# Patient Record
Sex: MALE | Race: WHITE | NOT HISPANIC OR LATINO | Employment: OTHER | URBAN - METROPOLITAN AREA
[De-identification: names, ages, dates, MRNs, and addresses within clinical notes are randomized per-mention and may not be internally consistent; named-entity substitution may affect disease eponyms.]

---

## 2023-07-19 ENCOUNTER — LAB (OUTPATIENT)
Dept: LAB | Facility: LAB | Age: 73
End: 2023-07-19
Payer: MEDICARE

## 2023-07-19 ENCOUNTER — OFFICE VISIT (OUTPATIENT)
Dept: PRIMARY CARE | Facility: CLINIC | Age: 73
End: 2023-07-19
Payer: MEDICARE

## 2023-07-19 VITALS
BODY MASS INDEX: 25.33 KG/M2 | HEART RATE: 53 BPM | WEIGHT: 171 LBS | TEMPERATURE: 98 F | HEIGHT: 69 IN | DIASTOLIC BLOOD PRESSURE: 80 MMHG | SYSTOLIC BLOOD PRESSURE: 113 MMHG | OXYGEN SATURATION: 98 %

## 2023-07-19 DIAGNOSIS — I10 HYPERTENSION, UNSPECIFIED TYPE: ICD-10-CM

## 2023-07-19 DIAGNOSIS — I10 ESSENTIAL (PRIMARY) HYPERTENSION: ICD-10-CM

## 2023-07-19 DIAGNOSIS — Z12.5 ENCOUNTER FOR SCREENING FOR MALIGNANT NEOPLASM OF PROSTATE: ICD-10-CM

## 2023-07-19 DIAGNOSIS — Z00.00 VISIT FOR PREVENTIVE HEALTH EXAMINATION: ICD-10-CM

## 2023-07-19 DIAGNOSIS — Z00.00 VISIT FOR PREVENTIVE HEALTH EXAMINATION: Primary | ICD-10-CM

## 2023-07-19 LAB
ALANINE AMINOTRANSFERASE (SGPT) (U/L) IN SER/PLAS: 13 U/L (ref 10–52)
ALBUMIN (G/DL) IN SER/PLAS: 4.6 G/DL (ref 3.4–5)
ALKALINE PHOSPHATASE (U/L) IN SER/PLAS: 68 U/L (ref 33–136)
ANION GAP IN SER/PLAS: 12 MMOL/L (ref 10–20)
ASPARTATE AMINOTRANSFERASE (SGOT) (U/L) IN SER/PLAS: 15 U/L (ref 9–39)
BILIRUBIN TOTAL (MG/DL) IN SER/PLAS: 1.9 MG/DL (ref 0–1.2)
CALCIUM (MG/DL) IN SER/PLAS: 9.7 MG/DL (ref 8.6–10.6)
CARBON DIOXIDE, TOTAL (MMOL/L) IN SER/PLAS: 30 MMOL/L (ref 21–32)
CHLORIDE (MMOL/L) IN SER/PLAS: 104 MMOL/L (ref 98–107)
CHOLESTEROL (MG/DL) IN SER/PLAS: 141 MG/DL (ref 0–199)
CHOLESTEROL IN HDL (MG/DL) IN SER/PLAS: 43.5 MG/DL
CHOLESTEROL/HDL RATIO: 3.2
CREATININE (MG/DL) IN SER/PLAS: 1.17 MG/DL (ref 0.5–1.3)
ERYTHROCYTE DISTRIBUTION WIDTH (RATIO) BY AUTOMATED COUNT: 16.5 % (ref 11.5–14.5)
ERYTHROCYTE MEAN CORPUSCULAR HEMOGLOBIN CONCENTRATION (G/DL) BY AUTOMATED: 33 G/DL (ref 32–36)
ERYTHROCYTE MEAN CORPUSCULAR VOLUME (FL) BY AUTOMATED COUNT: 106 FL (ref 80–100)
ERYTHROCYTES (10*6/UL) IN BLOOD BY AUTOMATED COUNT: 4.13 X10E12/L (ref 4.5–5.9)
GFR MALE: 67 ML/MIN/1.73M2
GLUCOSE (MG/DL) IN SER/PLAS: 103 MG/DL (ref 74–99)
HEMATOCRIT (%) IN BLOOD BY AUTOMATED COUNT: 43.9 % (ref 41–52)
HEMOGLOBIN (G/DL) IN BLOOD: 14.5 G/DL (ref 13.5–17.5)
LDL: 82 MG/DL (ref 0–99)
LEUKOCYTES (10*3/UL) IN BLOOD BY AUTOMATED COUNT: 8 X10E9/L (ref 4.4–11.3)
MUCUS, URINE: ABNORMAL /LPF
NRBC (PER 100 WBCS) BY AUTOMATED COUNT: 0 /100 WBC (ref 0–0)
PLATELETS (10*3/UL) IN BLOOD AUTOMATED COUNT: 183 X10E9/L (ref 150–450)
POTASSIUM (MMOL/L) IN SER/PLAS: 5.1 MMOL/L (ref 3.5–5.3)
PROSTATE SPECIFIC ANTIGEN,SCREEN: 2.05 NG/ML (ref 0–4)
PROTEIN TOTAL: 6.6 G/DL (ref 6.4–8.2)
RBC, URINE: 6 /HPF (ref 0–5)
SODIUM (MMOL/L) IN SER/PLAS: 141 MMOL/L (ref 136–145)
SQUAMOUS EPITHELIAL CELLS, URINE: <1 /HPF
THYROTROPIN (MIU/L) IN SER/PLAS BY DETECTION LIMIT <= 0.05 MIU/L: 1.82 MIU/L (ref 0.44–3.98)
TRIGLYCERIDE (MG/DL) IN SER/PLAS: 78 MG/DL (ref 0–149)
UREA NITROGEN (MG/DL) IN SER/PLAS: 29 MG/DL (ref 6–23)
VLDL: 16 MG/DL (ref 0–40)
WBC, URINE: 2 /HPF (ref 0–5)

## 2023-07-19 PROCEDURE — 80053 COMPREHEN METABOLIC PANEL: CPT

## 2023-07-19 PROCEDURE — 99497 ADVNCD CARE PLAN 30 MIN: CPT | Performed by: FAMILY MEDICINE

## 2023-07-19 PROCEDURE — 3079F DIAST BP 80-89 MM HG: CPT | Performed by: FAMILY MEDICINE

## 2023-07-19 PROCEDURE — 80061 LIPID PANEL: CPT

## 2023-07-19 PROCEDURE — 84443 ASSAY THYROID STIM HORMONE: CPT

## 2023-07-19 PROCEDURE — G0439 PPPS, SUBSEQ VISIT: HCPCS | Performed by: FAMILY MEDICINE

## 2023-07-19 PROCEDURE — G0444 DEPRESSION SCREEN ANNUAL: HCPCS | Performed by: FAMILY MEDICINE

## 2023-07-19 PROCEDURE — 36415 COLL VENOUS BLD VENIPUNCTURE: CPT

## 2023-07-19 PROCEDURE — 1157F ADVNC CARE PLAN IN RCRD: CPT | Performed by: FAMILY MEDICINE

## 2023-07-19 PROCEDURE — 81001 URINALYSIS AUTO W/SCOPE: CPT

## 2023-07-19 PROCEDURE — 1160F RVW MEDS BY RX/DR IN RCRD: CPT | Performed by: FAMILY MEDICINE

## 2023-07-19 PROCEDURE — G0103 PSA SCREENING: HCPCS

## 2023-07-19 PROCEDURE — 1036F TOBACCO NON-USER: CPT | Performed by: FAMILY MEDICINE

## 2023-07-19 PROCEDURE — 99213 OFFICE O/P EST LOW 20 MIN: CPT | Performed by: FAMILY MEDICINE

## 2023-07-19 PROCEDURE — 1159F MED LIST DOCD IN RCRD: CPT | Performed by: FAMILY MEDICINE

## 2023-07-19 PROCEDURE — 85027 COMPLETE CBC AUTOMATED: CPT

## 2023-07-19 PROCEDURE — 3074F SYST BP LT 130 MM HG: CPT | Performed by: FAMILY MEDICINE

## 2023-07-19 RX ORDER — ATENOLOL AND CHLORTHALIDONE TABLET 50; 25 MG/1; MG/1
0.5 TABLET ORAL DAILY
Qty: 45 TABLET | Refills: 2 | Status: SHIPPED | OUTPATIENT
Start: 2023-07-19 | End: 2024-07-18

## 2023-07-19 RX ORDER — VIT C/E/ZN/COPPR/LUTEIN/ZEAXAN 250MG-90MG
CAPSULE ORAL
COMMUNITY
Start: 2020-05-27

## 2023-07-19 RX ORDER — LOSARTAN POTASSIUM 100 MG/1
100 TABLET ORAL DAILY
Qty: 90 TABLET | Refills: 2 | Status: SHIPPED | OUTPATIENT
Start: 2023-07-19 | End: 2023-10-31

## 2023-07-19 ASSESSMENT — PATIENT HEALTH QUESTIONNAIRE - PHQ9
1. LITTLE INTEREST OR PLEASURE IN DOING THINGS: NOT AT ALL
SUM OF ALL RESPONSES TO PHQ9 QUESTIONS 1 AND 2: 0
2. FEELING DOWN, DEPRESSED OR HOPELESS: NOT AT ALL

## 2023-07-19 ASSESSMENT — LIFESTYLE VARIABLES
HOW OFTEN DO YOU HAVE SIX OR MORE DRINKS ON ONE OCCASION: NEVER
SKIP TO QUESTIONS 9-10: 1
HOW MANY STANDARD DRINKS CONTAINING ALCOHOL DO YOU HAVE ON A TYPICAL DAY: 1 OR 2
HOW OFTEN DO YOU HAVE A DRINK CONTAINING ALCOHOL: 2-3 TIMES A WEEK
AUDIT-C TOTAL SCORE: 3

## 2023-07-19 NOTE — PROGRESS NOTES
Subjective   Patient ID: Gregory Hartley is a 72 y.o. male who presents for Medicare Annual Wellness Visit Subsequent.    Assessment/Plan     Problem List Items Addressed This Visit       Visit for preventive health examination - Primary    Relevant Orders    Prostate Specific Antigen, Screen    Urinalysis Microscopic Only    TSH with reflex to Free T4 if abnormal    Lipid Panel    Comprehensive Metabolic Panel    CBC    Essential (primary) hypertension    Relevant Medications    atenoloL-chlorthalidone (Tenoretic) 50-25 mg tablet    losartan (Cozaar) 100 mg tablet    Other Relevant Orders    Prostate Specific Antigen, Screen    Urinalysis Microscopic Only    TSH with reflex to Free T4 if abnormal    Lipid Panel    Comprehensive Metabolic Panel    CBC     Other Visit Diagnoses       Hypertension, unspecified type        Encounter for screening for malignant neoplasm of prostate        Relevant Orders    Prostate Specific Antigen, Screen        Colonoscopy done earlier in January 2022 by Dr. Kulkarni hyperplastic  Received Pneumovax, discussed about Prevnar 20  Fasting lab work  Discussed about shingles vaccine  Follow-up every year    HPI  72-year-old male here for Medicare wellness visit and follow-up on        Hypertension   BPH following up with urology s/p PVP urinating better not on alfuzosin anymore  Vitamin D insufficiency   On and off left shoulder tingling numbness   Right leg seborrheic keratosis   Dyslipidemia  Left forehead squamous cell carcinoma removed following up with dermatology once a year  C-spine foraminal stenosis facet arthritis  Lipoma  Nephrolithiasis last year       Would like to stop antihypertensive advised patient to take losartan half tablet and eventually stop and see how the blood pressure her blood for well controlled then patient can stay away from losartan if blood pressure is more than 135 x 80 then patient can restart losartan same dose  Patient agrees    No significant past medical  "or surgical history.  No history of coronary artery disease        I spent >16 minutes discussing Advance Care Planning including the explanation and discussion of advance directives. If patient does not have current up to date documents, examples and information provided on how to create both Living Will and Power of . Patient was encouraged to work on completing these documents.. Patient has living will and his wife Sheeba is healthcare power of . Patient is full code     No Known Allergies    Current Outpatient Medications   Medication Sig Dispense Refill    cholecalciferol (Vitamin D-3) 25 MCG (1000 UT) capsule Take by mouth.      atenoloL-chlorthalidone (Tenoretic) 50-25 mg tablet Take 0.5 tablets by mouth once daily. 45 tablet 2    losartan (Cozaar) 100 mg tablet Take 1 tablet (100 mg) by mouth once daily. 90 tablet 2     No current facility-administered medications for this visit.       Objective   Visit Vitals  /80 (BP Location: Left arm, Patient Position: Sitting)   Pulse 53   Temp 36.7 °C (98 °F)   Ht 1.753 m (5' 9\")   Wt 77.6 kg (171 lb)   SpO2 98%   BMI 25.25 kg/m²   Smoking Status Never   BSA 1.94 m²     Physical Exam  Constitutional:       General: He is not in acute distress.     Appearance: Normal appearance.   HENT:      Head: Normocephalic and atraumatic.      Nose: Nose normal.   Eyes:      Extraocular Movements: Extraocular movements intact.      Conjunctiva/sclera: Conjunctivae normal.   Cardiovascular:      Rate and Rhythm: Normal rate and regular rhythm.   Pulmonary:      Effort: Pulmonary effort is normal.      Breath sounds: Normal breath sounds.   Skin:     General: Skin is warm.   Neurological:      Mental Status: He is alert and oriented to person, place, and time.   Psychiatric:         Mood and Affect: Mood normal.         Behavior: Behavior normal.   Immunization History   Administered Date(s) Administered    Moderna SARS-CoV-2 Vaccination 02/27/2021, 03/27/2021, " 01/02/2022       Review of Systems    No visits with results within 4 Month(s) from this visit.   Latest known visit with results is:   Legacy Encounter on 07/15/2022   Component Date Value Ref Range Status    TSH 07/15/2022 1.86  0.44 - 3.98 mIU/L Final    Color, Urine 07/15/2022 VENUS  STRAW,YELLOW Final    Appearance, Urine 07/15/2022 CLEAR  CLEAR Final    Specific Gravity, Urine 07/15/2022 1.025  1.005 - 1.035 Final    pH, Urine 07/15/2022 5.0  5.0 - 8.0 Final    Protein, Urine 07/15/2022 NEGATIVE  NEGATIVE mg/dL Final    Glucose, Urine 07/15/2022 NEGATIVE  NEGATIVE mg/dL Final    Blood, Urine 07/15/2022 MODERATE (2+) (A)  NEGATIVE Final    Ketones, Urine 07/15/2022 NEGATIVE  NEGATIVE mg/dL Final    Bilirubin, Urine 07/15/2022 NEGATIVE  NEGATIVE Final    Urobilinogen, Urine 07/15/2022 2.0 (H)  0.0 - 1.9 mg/dL Final    Nitrite, Urine 07/15/2022 NEGATIVE  NEGATIVE Final    Leukocyte Esterase, Urine 07/15/2022 NEGATIVE  NEGATIVE Final    WBC 07/15/2022 7.1  4.4 - 11.3 x10E9/L Final    nRBC 07/15/2022 0.0  0.0 - 0.0 /100 WBC Final    RBC 07/15/2022 4.39 (L)  4.50 - 5.90 x10E12/L Final    Hemoglobin 07/15/2022 15.0  13.5 - 17.5 g/dL Final    Hematocrit 07/15/2022 44.2  41.0 - 52.0 % Final    MCV 07/15/2022 101 (H)  80 - 100 fL Final    MCHC 07/15/2022 33.9  32.0 - 36.0 g/dL Final    Platelets 07/15/2022 194  150 - 450 x10E9/L Final    RDW 07/15/2022 15.9 (H)  11.5 - 14.5 % Final    WBC, Urine 07/15/2022 9 (A)  0 - 5 /HPF Final    RBC, Urine 07/15/2022 7 (A)  0 - 5 /HPF Final    Bacteria, Urine 07/15/2022 1+ (A)  /HPF Final    Mucus, Urine 07/15/2022 4+  /LPF Final    Prostate Specific Antigen,Screen 07/15/2022 2.47  0.00 - 4.00 ng/mL Final    Cholesterol 07/15/2022 142  0 - 199 mg/dL Final    HDL 07/15/2022 44.9  mg/dL Final    Cholesterol/HDL Ratio 07/15/2022 3.2   Final    LDL 07/15/2022 82  0 - 99 mg/dL Final    VLDL 07/15/2022 15  0 - 40 mg/dL Final    Triglycerides 07/15/2022 74  0 - 149 mg/dL Final    Glucose  07/15/2022 90  74 - 99 mg/dL Final    Sodium 07/15/2022 141  136 - 145 mmol/L Final    Potassium 07/15/2022 3.9  3.5 - 5.3 mmol/L Final    Chloride 07/15/2022 103  98 - 107 mmol/L Final    Bicarbonate 07/15/2022 30  21 - 32 mmol/L Final    Anion Gap 07/15/2022 12  10 - 20 mmol/L Final    Urea Nitrogen 07/15/2022 29 (H)  6 - 23 mg/dL Final    Creatinine 07/15/2022 1.09  0.50 - 1.30 mg/dL Final    GFR MALE 07/15/2022 73  >90 mL/min/1.73m2 Final    Calcium 07/15/2022 9.3  8.6 - 10.6 mg/dL Final    Albumin 07/15/2022 4.5  3.4 - 5.0 g/dL Final    Alkaline Phosphatase 07/15/2022 61  33 - 136 U/L Final    Total Protein 07/15/2022 6.4  6.4 - 8.2 g/dL Final    AST 07/15/2022 14  9 - 39 U/L Final    Total Bilirubin 07/15/2022 1.4 (H)  0.0 - 1.2 mg/dL Final    ALT (SGPT) 07/15/2022 11  10 - 52 U/L Final       Radiology: Reviewed imaging in powerchart.  No results found.    No family history on file.  Social History     Socioeconomic History    Marital status: Unknown     Spouse name: None    Number of children: None    Years of education: None    Highest education level: None   Occupational History    None   Tobacco Use    Smoking status: Never    Smokeless tobacco: Never   Substance and Sexual Activity    Alcohol use: Not Currently    Drug use: Never    Sexual activity: None   Other Topics Concern    None   Social History Narrative    None     Social Determinants of Health     Financial Resource Strain: Not on file   Food Insecurity: Not on file   Transportation Needs: Not on file   Physical Activity: Not on file   Stress: Not on file   Social Connections: Not on file   Intimate Partner Violence: Not on file   Housing Stability: Not on file     Past Medical History:   Diagnosis Date    Benign prostatic hyperplasia without lower urinary tract symptoms 01/13/2016    Enlarged prostate    Cyst of kidney, acquired     Renal cyst    Other specified diseases of liver     Liver cyst    Personal history of colonic polyps     History  of colon polyps    Personal history of other endocrine, nutritional and metabolic disease     History of hyperlipidemia     Past Surgical History:   Procedure Laterality Date    OTHER SURGICAL HISTORY  01/13/2021    No history of surgery       Charting was completed using voice recognition technology and may include unintended errors.

## 2023-10-27 DIAGNOSIS — I10 ESSENTIAL (PRIMARY) HYPERTENSION: ICD-10-CM

## 2023-10-31 RX ORDER — LOSARTAN POTASSIUM 100 MG/1
100 TABLET ORAL DAILY
Qty: 90 TABLET | Refills: 2 | Status: SHIPPED | OUTPATIENT
Start: 2023-10-31

## 2024-03-20 ENCOUNTER — OFFICE VISIT (OUTPATIENT)
Dept: PRIMARY CARE | Facility: CLINIC | Age: 74
End: 2024-03-20
Payer: MEDICARE

## 2024-03-20 VITALS
DIASTOLIC BLOOD PRESSURE: 88 MMHG | HEART RATE: 59 BPM | SYSTOLIC BLOOD PRESSURE: 144 MMHG | OXYGEN SATURATION: 98 % | WEIGHT: 176 LBS | BODY MASS INDEX: 26.07 KG/M2 | TEMPERATURE: 97.3 F | HEIGHT: 69 IN

## 2024-03-20 DIAGNOSIS — S39.011A STRAIN OF MUSCLE OF RIGHT GROIN REGION: Primary | ICD-10-CM

## 2024-03-20 PROCEDURE — 1160F RVW MEDS BY RX/DR IN RCRD: CPT | Performed by: FAMILY MEDICINE

## 2024-03-20 PROCEDURE — 1036F TOBACCO NON-USER: CPT | Performed by: FAMILY MEDICINE

## 2024-03-20 PROCEDURE — 99213 OFFICE O/P EST LOW 20 MIN: CPT | Performed by: FAMILY MEDICINE

## 2024-03-20 PROCEDURE — 3077F SYST BP >= 140 MM HG: CPT | Performed by: FAMILY MEDICINE

## 2024-03-20 PROCEDURE — 1159F MED LIST DOCD IN RCRD: CPT | Performed by: FAMILY MEDICINE

## 2024-03-20 PROCEDURE — 3079F DIAST BP 80-89 MM HG: CPT | Performed by: FAMILY MEDICINE

## 2024-03-20 PROCEDURE — 1157F ADVNC CARE PLAN IN RCRD: CPT | Performed by: FAMILY MEDICINE

## 2024-03-20 RX ORDER — NAPROXEN 500 MG/1
500 TABLET ORAL 2 TIMES DAILY PRN
Qty: 20 TABLET | Refills: 0 | Status: SHIPPED | OUTPATIENT
Start: 2024-03-20 | End: 2024-03-30

## 2024-03-20 RX ORDER — PREDNISONE 20 MG/1
20 TABLET ORAL DAILY
Qty: 7 TABLET | Refills: 0 | Status: SHIPPED | OUTPATIENT
Start: 2024-03-20 | End: 2024-03-27

## 2024-03-20 NOTE — PROGRESS NOTES
"Subjective   Patient ID: Gregory Hartley is a 73 y.o. male who presents for Leg Pain (Right leg/groin pain x 2wks, and is numb ).    Assessment/Plan     Problem List Items Addressed This Visit    None  Visit Diagnoses       Strain of muscle of right groin region    -  Primary    Relevant Medications    predniSONE (Deltasone) 20 mg tablet    naproxen (Naprosyn) 500 mg tablet        Mostly nerve compression with muscle spasm right groin strain  Considerable medication call us or follow-up if symptoms are worsening  Patient agrees    HPI    73-year-old male complaining of right-sided groin pain started after playing golf patient states started having right groin pain couple of weeks ago particularly while sitting  Patient is able to move his hip in all directions without any pain though      Pt started developing slowly numbness in the right thigh region mainly on the medial aspect    Patient states he took 1 ibuprofen it does help          No Known Allergies    Current Outpatient Medications   Medication Sig Dispense Refill    atenoloL-chlorthalidone (Tenoretic) 50-25 mg tablet Take 0.5 tablets by mouth once daily. 45 tablet 2    cholecalciferol (Vitamin D-3) 25 MCG (1000 UT) capsule Take by mouth.      losartan (Cozaar) 100 mg tablet TAKE 1 TABLET BY MOUTH EVERY DAY (Patient not taking: Reported on 3/20/2024) 90 tablet 2    naproxen (Naprosyn) 500 mg tablet Take 1 tablet (500 mg) by mouth 2 times a day as needed for mild pain (1 - 3) (pain) for up to 10 days. 20 tablet 0    predniSONE (Deltasone) 20 mg tablet Take 1 tablet (20 mg) by mouth once daily for 7 days. 7 tablet 0     No current facility-administered medications for this visit.       Objective   Visit Vitals  /88 (BP Location: Left arm, Patient Position: Sitting)   Pulse 59   Temp 36.3 °C (97.3 °F)   Ht 1.753 m (5' 9\")   Wt 79.8 kg (176 lb)   SpO2 98%   BMI 25.99 kg/m²   Smoking Status Never   BSA 1.97 m²     Physical Exam  Constitutional:       General: " He is not in acute distress.     Appearance: Normal appearance.   HENT:      Head: Normocephalic and atraumatic.      Nose: Nose normal.   Eyes:      Extraocular Movements: Extraocular movements intact.      Conjunctiva/sclera: Conjunctivae normal.   Cardiovascular:      Rate and Rhythm: Normal rate and regular rhythm.   Pulmonary:      Effort: Pulmonary effort is normal.      Breath sounds: Normal breath sounds.   Skin:     General: Skin is warm.   Neurological:      Mental Status: He is alert and oriented to person, place, and time.   Psychiatric:         Mood and Affect: Mood normal.         Behavior: Behavior normal.     Right hip joint movement essentially within normal limits  Minimal tenderness in right groin region on deep palpation    Immunization History   Administered Date(s) Administered    Flu vaccine, quadrivalent, high-dose, preservative free, age 65y+ (FLUZONE) 12/19/2022    Influenza, Seasonal, Quadrivalent, Adjuvanted 10/06/2021    Influenza, Unspecified 11/23/2010    Influenza, injectable, quadrivalent 09/12/2017    Influenza, seasonal, injectable 01/21/2017, 10/15/2023    Moderna SARS-CoV-2 Vaccination 02/27/2021, 03/27/2021, 01/02/2022    Pneumococcal polysaccharide vaccine, 23-valent, age 2 years and older (PNEUMOVAX 23) 08/15/2018       Review of Systems    No visits with results within 4 Month(s) from this visit.   Latest known visit with results is:   Lab on 07/19/2023   Component Date Value Ref Range Status    Prostate Specific Antigen,Screen 07/19/2023 2.05  0.00 - 4.00 ng/mL Final    WBC, Urine 07/19/2023 2  0 - 5 /HPF Final    RBC, Urine 07/19/2023 6 (A)  0 - 5 /HPF Final    Squamous Epithelial Cells, Urine 07/19/2023 <1  /HPF Final    Mucus, Urine 07/19/2023 4+  /LPF Final    TSH 07/19/2023 1.82  0.44 - 3.98 mIU/L Final    Cholesterol 07/19/2023 141  0 - 199 mg/dL Final    HDL 07/19/2023 43.5  mg/dL Final    Cholesterol/HDL Ratio 07/19/2023 3.2   Final    LDL 07/19/2023 82  0 - 99 mg/dL  Final    VLDL 07/19/2023 16  0 - 40 mg/dL Final    Triglycerides 07/19/2023 78  0 - 149 mg/dL Final    Glucose 07/19/2023 103 (H)  74 - 99 mg/dL Final    Sodium 07/19/2023 141  136 - 145 mmol/L Final    Potassium 07/19/2023 5.1  3.5 - 5.3 mmol/L Final    Chloride 07/19/2023 104  98 - 107 mmol/L Final    Bicarbonate 07/19/2023 30  21 - 32 mmol/L Final    Anion Gap 07/19/2023 12  10 - 20 mmol/L Final    Urea Nitrogen 07/19/2023 29 (H)  6 - 23 mg/dL Final    Creatinine 07/19/2023 1.17  0.50 - 1.30 mg/dL Final    GFR MALE 07/19/2023 67  >90 mL/min/1.73m2 Final    Calcium 07/19/2023 9.7  8.6 - 10.6 mg/dL Final    Albumin 07/19/2023 4.6  3.4 - 5.0 g/dL Final    Alkaline Phosphatase 07/19/2023 68  33 - 136 U/L Final    Total Protein 07/19/2023 6.6  6.4 - 8.2 g/dL Final    AST 07/19/2023 15  9 - 39 U/L Final    Total Bilirubin 07/19/2023 1.9 (H)  0.0 - 1.2 mg/dL Final    ALT (SGPT) 07/19/2023 13  10 - 52 U/L Final    WBC 07/19/2023 8.0  4.4 - 11.3 x10E9/L Final    nRBC 07/19/2023 0.0  0.0 - 0.0 /100 WBC Final    RBC 07/19/2023 4.13 (L)  4.50 - 5.90 x10E12/L Final    Hemoglobin 07/19/2023 14.5  13.5 - 17.5 g/dL Final    Hematocrit 07/19/2023 43.9  41.0 - 52.0 % Final    MCV 07/19/2023 106 (H)  80 - 100 fL Final    MCHC 07/19/2023 33.0  32.0 - 36.0 g/dL Final    Platelets 07/19/2023 183  150 - 450 x10E9/L Final    RDW 07/19/2023 16.5 (H)  11.5 - 14.5 % Final       Radiology: Reviewed imaging in powerchart.  IOL BIOMETRY W/ IOL CALC OU (BOTH EYES)    Result Date: 3/11/2024  Date of Procedure 3/11/2024. Technician Information MAMADOU Harding 3/11/2024 12:23 PM Post m-lasik OU [place] OD aim -2.00 OS plano Historical questions prior to cataract surgery measurements: Has the patient ever had surgery in their eyes before that will directly influence the IOL calculation (including but not limited to a scleral buckle or corneal transplant)? No Has the patient ever had a refractive procedure performed on their eyes (including but not  limited to Lasik, PRK, RK, AK)? Yes Has the patient worn contact lenses in the last 3 months? No . Notes Measurements only - see Procedure Record under Scanned Documents for signed results.       No family history on file.  Social History     Socioeconomic History    Marital status: Unknown     Spouse name: None    Number of children: None    Years of education: None    Highest education level: None   Occupational History    None   Tobacco Use    Smoking status: Never    Smokeless tobacco: Never   Substance and Sexual Activity    Alcohol use: Not Currently    Drug use: Never    Sexual activity: None   Other Topics Concern    None   Social History Narrative    None     Social Determinants of Health     Financial Resource Strain: Not on file   Food Insecurity: Not on file   Transportation Needs: Not on file   Physical Activity: Not on file   Stress: Not on file   Social Connections: Not on file   Intimate Partner Violence: Not on file   Housing Stability: Not on file     Past Medical History:   Diagnosis Date    Benign prostatic hyperplasia without lower urinary tract symptoms 01/13/2016    Enlarged prostate    Cyst of kidney, acquired     Renal cyst    Other specified diseases of liver     Liver cyst    Personal history of colonic polyps     History of colon polyps    Personal history of other endocrine, nutritional and metabolic disease     History of hyperlipidemia     Past Surgical History:   Procedure Laterality Date    OTHER SURGICAL HISTORY  01/13/2021    No history of surgery       Charting was completed using voice recognition technology and may include unintended errors.

## 2024-10-25 DIAGNOSIS — I10 ESSENTIAL (PRIMARY) HYPERTENSION: ICD-10-CM

## 2024-10-25 RX ORDER — ATENOLOL AND CHLORTHALIDONE TABLET 50; 25 MG/1; MG/1
0.5 TABLET ORAL DAILY
Qty: 45 TABLET | Refills: 0 | Status: SHIPPED | OUTPATIENT
Start: 2024-10-25 | End: 2025-10-25

## 2025-01-21 DIAGNOSIS — I10 ESSENTIAL (PRIMARY) HYPERTENSION: ICD-10-CM

## 2025-01-24 NOTE — TELEPHONE ENCOUNTER
Patient called asking for a refill, patient has not been seen since 3/2024. Please schedule an appointment if he calls back.

## 2025-02-06 ENCOUNTER — APPOINTMENT (OUTPATIENT)
Dept: PRIMARY CARE | Facility: CLINIC | Age: 75
End: 2025-02-06
Payer: MEDICARE

## 2025-02-06 VITALS
OXYGEN SATURATION: 99 % | WEIGHT: 177 LBS | SYSTOLIC BLOOD PRESSURE: 142 MMHG | TEMPERATURE: 97.3 F | BODY MASS INDEX: 26.22 KG/M2 | HEART RATE: 46 BPM | DIASTOLIC BLOOD PRESSURE: 88 MMHG | HEIGHT: 69 IN

## 2025-02-06 DIAGNOSIS — I10 ESSENTIAL (PRIMARY) HYPERTENSION: ICD-10-CM

## 2025-02-06 DIAGNOSIS — Z00.00 VISIT FOR PREVENTIVE HEALTH EXAMINATION: Primary | ICD-10-CM

## 2025-02-06 DIAGNOSIS — Z12.5 ENCOUNTER FOR SCREENING FOR MALIGNANT NEOPLASM OF PROSTATE: ICD-10-CM

## 2025-02-06 PROCEDURE — G0009 ADMIN PNEUMOCOCCAL VACCINE: HCPCS | Performed by: FAMILY MEDICINE

## 2025-02-06 PROCEDURE — 99497 ADVNCD CARE PLAN 30 MIN: CPT | Performed by: FAMILY MEDICINE

## 2025-02-06 PROCEDURE — 3008F BODY MASS INDEX DOCD: CPT | Performed by: FAMILY MEDICINE

## 2025-02-06 PROCEDURE — 1170F FXNL STATUS ASSESSED: CPT | Performed by: FAMILY MEDICINE

## 2025-02-06 PROCEDURE — G0008 ADMIN INFLUENZA VIRUS VAC: HCPCS | Performed by: FAMILY MEDICINE

## 2025-02-06 PROCEDURE — 90662 IIV NO PRSV INCREASED AG IM: CPT | Performed by: FAMILY MEDICINE

## 2025-02-06 PROCEDURE — 3077F SYST BP >= 140 MM HG: CPT | Performed by: FAMILY MEDICINE

## 2025-02-06 PROCEDURE — 1160F RVW MEDS BY RX/DR IN RCRD: CPT | Performed by: FAMILY MEDICINE

## 2025-02-06 PROCEDURE — 90677 PCV20 VACCINE IM: CPT | Performed by: FAMILY MEDICINE

## 2025-02-06 PROCEDURE — G0439 PPPS, SUBSEQ VISIT: HCPCS | Performed by: FAMILY MEDICINE

## 2025-02-06 PROCEDURE — 3079F DIAST BP 80-89 MM HG: CPT | Performed by: FAMILY MEDICINE

## 2025-02-06 PROCEDURE — 99213 OFFICE O/P EST LOW 20 MIN: CPT | Performed by: FAMILY MEDICINE

## 2025-02-06 PROCEDURE — G0444 DEPRESSION SCREEN ANNUAL: HCPCS | Performed by: FAMILY MEDICINE

## 2025-02-06 PROCEDURE — 1123F ACP DISCUSS/DSCN MKR DOCD: CPT | Performed by: FAMILY MEDICINE

## 2025-02-06 PROCEDURE — 1036F TOBACCO NON-USER: CPT | Performed by: FAMILY MEDICINE

## 2025-02-06 PROCEDURE — 1159F MED LIST DOCD IN RCRD: CPT | Performed by: FAMILY MEDICINE

## 2025-02-06 PROCEDURE — 1157F ADVNC CARE PLAN IN RCRD: CPT | Performed by: FAMILY MEDICINE

## 2025-02-06 RX ORDER — ATENOLOL AND CHLORTHALIDONE TABLET 50; 25 MG/1; MG/1
0.5 TABLET ORAL DAILY
Qty: 45 TABLET | Refills: 3 | Status: SHIPPED | OUTPATIENT
Start: 2025-02-06 | End: 2026-02-06

## 2025-02-06 RX ORDER — ATENOLOL AND CHLORTHALIDONE TABLET 50; 25 MG/1; MG/1
TABLET ORAL DAILY
Qty: 45 TABLET | Refills: 0 | OUTPATIENT
Start: 2025-02-06

## 2025-02-06 ASSESSMENT — ACTIVITIES OF DAILY LIVING (ADL)
GROCERY_SHOPPING: INDEPENDENT
MANAGING_FINANCES: INDEPENDENT
DOING_HOUSEWORK: INDEPENDENT
TAKING_MEDICATION: INDEPENDENT
DRESSING: INDEPENDENT
BATHING: INDEPENDENT

## 2025-02-06 ASSESSMENT — LIFESTYLE VARIABLES
HOW OFTEN DO YOU HAVE A DRINK CONTAINING ALCOHOL: 2-3 TIMES A WEEK
HOW MANY STANDARD DRINKS CONTAINING ALCOHOL DO YOU HAVE ON A TYPICAL DAY: 1 OR 2
AUDIT-C TOTAL SCORE: 3
SKIP TO QUESTIONS 9-10: 1
HOW OFTEN DO YOU HAVE SIX OR MORE DRINKS ON ONE OCCASION: NEVER

## 2025-02-06 ASSESSMENT — PATIENT HEALTH QUESTIONNAIRE - PHQ9
SUM OF ALL RESPONSES TO PHQ9 QUESTIONS 1 AND 2: 0
2. FEELING DOWN, DEPRESSED OR HOPELESS: NOT AT ALL
1. LITTLE INTEREST OR PLEASURE IN DOING THINGS: NOT AT ALL

## 2025-02-06 NOTE — PROGRESS NOTES
Subjective   Patient ID: Gregory Hartley is a 74 y.o. male who presents for Annual Exam.    Assessment/Plan     Problem List Items Addressed This Visit       Visit for preventive health examination - Primary    Relevant Orders    Flu vaccine, trivalent, preservative free, HIGH-DOSE, age 65y+ (Fluzone)    Prostate Specific Antigen, Screen    TSH with reflex to Free T4 if abnormal    Lipid Panel    Comprehensive Metabolic Panel    CBC    Urinalysis with Reflex Microscopic    Pneumococcal conjugate vaccine, 20-valent (PREVNAR 20)    Essential (primary) hypertension    Relevant Orders    TSH with reflex to Free T4 if abnormal    Lipid Panel    Comprehensive Metabolic Panel    CBC    Urinalysis with Reflex Microscopic    Magnesium     Other Visit Diagnoses       Encounter for screening for malignant neoplasm of prostate        Relevant Orders    Prostate Specific Antigen, Screen        Time spent around 10 minutes obtaining and discussing depression screening using PHQ 9 questions with results documented in the chart    Colonoscopy done earlier in January 2022 by Dr. Kulkarni hyperplastic  Received Pneumovax, discussed about Prevnar 20 and flu vaccine today  Fasting lab work  Discussed about shingles vaccine  Follow-up every year    Meds refilled    HPI      74-year-old male here for Medicare wellness visit and follow-up on        Hypertension   BPH following up with urology s/p PVP urinating better not on alfuzosin anymore  Vitamin D insufficiency   On and off left shoulder tingling numbness   Right leg seborrheic keratosis   Dyslipidemia  Left forehead squamous cell carcinoma removed following up with dermatology once a year  C-spine foraminal stenosis facet arthritis  Lipoma  Nephrolithiasis last year    Stop losartan secondary to dizziness    Fasting for lab work today  No new sign and symptoms    No significant past medical or surgical history.  No history of coronary artery disease        I spent >16 minutes  "discussing Advance Care Planning including the explanation and discussion of advance directives. If patient does not have current up to date documents, examples and information provided on how to create both Living Will and Power of . Patient was encouraged to work on completing these documents.. Patient has living will and his wife Sheeba is healthcare power of . Patient is full code     No Known Allergies    Current Outpatient Medications   Medication Sig Dispense Refill    atenoloL-chlorthalidone (Tenoretic) 50-25 mg tablet Take 0.5 tablets by mouth once daily. 45 tablet 0    cholecalciferol (Vitamin D-3) 25 MCG (1000 UT) capsule Take by mouth.       No current facility-administered medications for this visit.       Objective   Visit Vitals  /88 (BP Location: Left arm, Patient Position: Sitting)   Pulse (!) 46   Temp 36.3 °C (97.3 °F)   Ht 1.753 m (5' 9\")   Wt 80.3 kg (177 lb)   SpO2 99%   BMI 26.14 kg/m²   Smoking Status Never   BSA 1.98 m²     Physical Exam  Constitutional:       General: He is not in acute distress.     Appearance: Normal appearance.   HENT:      Head: Normocephalic and atraumatic.      Nose: Nose normal.   Eyes:      Extraocular Movements: Extraocular movements intact.      Conjunctiva/sclera: Conjunctivae normal.   Cardiovascular:      Rate and Rhythm: Normal rate and regular rhythm.   Pulmonary:      Effort: Pulmonary effort is normal.      Breath sounds: Normal breath sounds.   Skin:     General: Skin is warm.   Neurological:      Mental Status: He is alert and oriented to person, place, and time.   Psychiatric:         Mood and Affect: Mood normal.         Behavior: Behavior normal.   Immunization History   Administered Date(s) Administered    Flu vaccine, quadrivalent, high-dose, preservative free, age 65y+ (FLUZONE) 12/19/2022    Flu vaccine, trivalent, preservative free, HIGH-DOSE, age 65y+ (Fluzone) 12/19/2022    Influenza, Seasonal, Quadrivalent, Adjuvanted " 10/06/2021, 10/15/2023    Influenza, Unspecified 11/23/2010    Influenza, injectable, quadrivalent 09/12/2017    Influenza, seasonal, injectable 01/21/2017, 10/15/2023    Moderna SARS-CoV-2 Vaccination 02/27/2021, 03/27/2021, 01/02/2022    Pneumococcal polysaccharide vaccine, 23-valent, age 2 years and older (PNEUMOVAX 23) 08/15/2018       Review of Systems    No visits with results within 4 Month(s) from this visit.   Latest known visit with results is:   Lab on 07/19/2023   Component Date Value Ref Range Status    Prostate Specific Antigen,Screen 07/19/2023 2.05  0.00 - 4.00 ng/mL Final    WBC, Urine 07/19/2023 2  0 - 5 /HPF Final    RBC, Urine 07/19/2023 6 (A)  0 - 5 /HPF Final    Squamous Epithelial Cells, Urine 07/19/2023 <1  /HPF Final    Mucus, Urine 07/19/2023 4+  /LPF Final    TSH 07/19/2023 1.82  0.44 - 3.98 mIU/L Final    Cholesterol 07/19/2023 141  0 - 199 mg/dL Final    HDL 07/19/2023 43.5  mg/dL Final    Cholesterol/HDL Ratio 07/19/2023 3.2   Final    LDL 07/19/2023 82  0 - 99 mg/dL Final    VLDL 07/19/2023 16  0 - 40 mg/dL Final    Triglycerides 07/19/2023 78  0 - 149 mg/dL Final    Glucose 07/19/2023 103 (H)  74 - 99 mg/dL Final    Sodium 07/19/2023 141  136 - 145 mmol/L Final    Potassium 07/19/2023 5.1  3.5 - 5.3 mmol/L Final    Chloride 07/19/2023 104  98 - 107 mmol/L Final    Bicarbonate 07/19/2023 30  21 - 32 mmol/L Final    Anion Gap 07/19/2023 12  10 - 20 mmol/L Final    Urea Nitrogen 07/19/2023 29 (H)  6 - 23 mg/dL Final    Creatinine 07/19/2023 1.17  0.50 - 1.30 mg/dL Final    GFR MALE 07/19/2023 67  >90 mL/min/1.73m2 Final    Calcium 07/19/2023 9.7  8.6 - 10.6 mg/dL Final    Albumin 07/19/2023 4.6  3.4 - 5.0 g/dL Final    Alkaline Phosphatase 07/19/2023 68  33 - 136 U/L Final    Total Protein 07/19/2023 6.6  6.4 - 8.2 g/dL Final    AST 07/19/2023 15  9 - 39 U/L Final    Total Bilirubin 07/19/2023 1.9 (H)  0.0 - 1.2 mg/dL Final    ALT (SGPT) 07/19/2023 13  10 - 52 U/L Final    WBC 07/19/2023  8.0  4.4 - 11.3 x10E9/L Final    nRBC 07/19/2023 0.0  0.0 - 0.0 /100 WBC Final    RBC 07/19/2023 4.13 (L)  4.50 - 5.90 x10E12/L Final    Hemoglobin 07/19/2023 14.5  13.5 - 17.5 g/dL Final    Hematocrit 07/19/2023 43.9  41.0 - 52.0 % Final    MCV 07/19/2023 106 (H)  80 - 100 fL Final    MCHC 07/19/2023 33.0  32.0 - 36.0 g/dL Final    Platelets 07/19/2023 183  150 - 450 x10E9/L Final    RDW 07/19/2023 16.5 (H)  11.5 - 14.5 % Final       Radiology: Reviewed imaging in powerchart.  No results found.    No family history on file.  Social History     Socioeconomic History    Marital status: Unknown   Tobacco Use    Smoking status: Never    Smokeless tobacco: Never   Substance and Sexual Activity    Alcohol use: Not Currently    Drug use: Never     Past Medical History:   Diagnosis Date    Benign prostatic hyperplasia without lower urinary tract symptoms 01/13/2016    Enlarged prostate    Cyst of kidney, acquired     Renal cyst    Other specified diseases of liver     Liver cyst    Personal history of colonic polyps     History of colon polyps    Personal history of other endocrine, nutritional and metabolic disease     History of hyperlipidemia     Past Surgical History:   Procedure Laterality Date    OTHER SURGICAL HISTORY  01/13/2021    No history of surgery       Charting was completed using voice recognition technology and may include unintended errors.

## 2025-02-07 ENCOUNTER — TELEPHONE (OUTPATIENT)
Dept: PRIMARY CARE | Facility: CLINIC | Age: 75
End: 2025-02-07
Payer: MEDICARE

## 2025-02-07 LAB
ALBUMIN SERPL-MCNC: 4.4 G/DL (ref 3.6–5.1)
ALP SERPL-CCNC: 61 U/L (ref 35–144)
ALT SERPL-CCNC: 13 U/L (ref 9–46)
ANION GAP SERPL CALCULATED.4IONS-SCNC: 6 MMOL/L (CALC) (ref 7–17)
APPEARANCE UR: CLEAR
AST SERPL-CCNC: 17 U/L (ref 10–35)
BACTERIA #/AREA URNS HPF: ABNORMAL /HPF
BILIRUB SERPL-MCNC: 1.7 MG/DL (ref 0.2–1.2)
BILIRUB UR QL STRIP: ABNORMAL
BUN SERPL-MCNC: 24 MG/DL (ref 7–25)
CALCIUM SERPL-MCNC: 9.2 MG/DL (ref 8.6–10.3)
CHLORIDE SERPL-SCNC: 104 MMOL/L (ref 98–110)
CHOLEST SERPL-MCNC: 138 MG/DL
CHOLEST/HDLC SERPL: 2.9 (CALC)
CO2 SERPL-SCNC: 31 MMOL/L (ref 20–32)
COLOR UR: ABNORMAL
CREAT SERPL-MCNC: 0.95 MG/DL (ref 0.7–1.28)
EGFRCR SERPLBLD CKD-EPI 2021: 85 ML/MIN/1.73M2
ERYTHROCYTE [DISTWIDTH] IN BLOOD BY AUTOMATED COUNT: 14.2 % (ref 11–15)
GLUCOSE SERPL-MCNC: 112 MG/DL (ref 65–99)
GLUCOSE UR QL STRIP: NEGATIVE
HCT VFR BLD AUTO: 42.1 % (ref 38.5–50)
HDLC SERPL-MCNC: 47 MG/DL
HGB BLD-MCNC: 14.5 G/DL (ref 13.2–17.1)
HGB UR QL STRIP: ABNORMAL
HYALINE CASTS #/AREA URNS LPF: ABNORMAL /LPF
KETONES UR QL STRIP: ABNORMAL
LDLC SERPL CALC-MCNC: 73 MG/DL (CALC)
LEUKOCYTE ESTERASE UR QL STRIP: ABNORMAL
MAGNESIUM SERPL-MCNC: 2.1 MG/DL (ref 1.5–2.5)
MCH RBC QN AUTO: 35.3 PG (ref 27–33)
MCHC RBC AUTO-ENTMCNC: 34.4 G/DL (ref 32–36)
MCV RBC AUTO: 102.4 FL (ref 80–100)
NITRITE UR QL STRIP: NEGATIVE
NONHDLC SERPL-MCNC: 91 MG/DL (CALC)
PH UR STRIP: 5.5 [PH] (ref 5–8)
PLATELET # BLD AUTO: 155 THOUSAND/UL (ref 140–400)
PMV BLD REES-ECKER: 11.3 FL (ref 7.5–12.5)
POTASSIUM SERPL-SCNC: 4.6 MMOL/L (ref 3.5–5.3)
PROT SERPL-MCNC: 6.2 G/DL (ref 6.1–8.1)
PROT UR QL STRIP: NEGATIVE
PSA SERPL-MCNC: 0.91 NG/ML
RBC # BLD AUTO: 4.11 MILLION/UL (ref 4.2–5.8)
RBC #/AREA URNS HPF: ABNORMAL /HPF
SERVICE CMNT-IMP: ABNORMAL
SODIUM SERPL-SCNC: 141 MMOL/L (ref 135–146)
SP GR UR STRIP: 1.02 (ref 1–1.03)
SQUAMOUS #/AREA URNS HPF: ABNORMAL /HPF
TRIGL SERPL-MCNC: 95 MG/DL
TSH SERPL-ACNC: 2.03 MIU/L (ref 0.4–4.5)
WBC # BLD AUTO: 6.4 THOUSAND/UL (ref 3.8–10.8)
WBC #/AREA URNS HPF: ABNORMAL /HPF

## 2025-02-07 NOTE — TELEPHONE ENCOUNTER
----- Message from Juice Robison sent at 2/7/2025  3:26 PM EST -----  Add hemoglobin A1c on lab work.  Also patient is to resubmit urine sample as urine sample is positive for small amount of blood.  If it is positive we will refer patient to urology.

## 2025-02-10 LAB
ALBUMIN SERPL-MCNC: 4.4 G/DL (ref 3.6–5.1)
ALP SERPL-CCNC: 61 U/L (ref 35–144)
ALT SERPL-CCNC: 13 U/L (ref 9–46)
ANION GAP SERPL CALCULATED.4IONS-SCNC: 6 MMOL/L (CALC) (ref 7–17)
APPEARANCE UR: CLEAR
AST SERPL-CCNC: 17 U/L (ref 10–35)
BACTERIA #/AREA URNS HPF: ABNORMAL /HPF
BILIRUB SERPL-MCNC: 1.7 MG/DL (ref 0.2–1.2)
BILIRUB UR QL STRIP: ABNORMAL
BUN SERPL-MCNC: 24 MG/DL (ref 7–25)
CALCIUM SERPL-MCNC: 9.2 MG/DL (ref 8.6–10.3)
CHLORIDE SERPL-SCNC: 104 MMOL/L (ref 98–110)
CHOLEST SERPL-MCNC: 138 MG/DL
CHOLEST/HDLC SERPL: 2.9 (CALC)
CO2 SERPL-SCNC: 31 MMOL/L (ref 20–32)
COLOR UR: ABNORMAL
CREAT SERPL-MCNC: 0.95 MG/DL (ref 0.7–1.28)
EGFRCR SERPLBLD CKD-EPI 2021: 85 ML/MIN/1.73M2
ERYTHROCYTE [DISTWIDTH] IN BLOOD BY AUTOMATED COUNT: 14.2 % (ref 11–15)
GLUCOSE SERPL-MCNC: 112 MG/DL (ref 65–99)
GLUCOSE UR QL STRIP: NEGATIVE
HBA1C MFR BLD: 4.7 % OF TOTAL HGB
HCT VFR BLD AUTO: 42.1 % (ref 38.5–50)
HDLC SERPL-MCNC: 47 MG/DL
HGB BLD-MCNC: 14.5 G/DL (ref 13.2–17.1)
HGB UR QL STRIP: ABNORMAL
HYALINE CASTS #/AREA URNS LPF: ABNORMAL /LPF
KETONES UR QL STRIP: ABNORMAL
LDLC SERPL CALC-MCNC: 73 MG/DL (CALC)
LEUKOCYTE ESTERASE UR QL STRIP: ABNORMAL
MAGNESIUM SERPL-MCNC: 2.1 MG/DL (ref 1.5–2.5)
MCH RBC QN AUTO: 35.3 PG (ref 27–33)
MCHC RBC AUTO-ENTMCNC: 34.4 G/DL (ref 32–36)
MCV RBC AUTO: 102.4 FL (ref 80–100)
NITRITE UR QL STRIP: NEGATIVE
NONHDLC SERPL-MCNC: 91 MG/DL (CALC)
PH UR STRIP: 5.5 [PH] (ref 5–8)
PLATELET # BLD AUTO: 155 THOUSAND/UL (ref 140–400)
PMV BLD REES-ECKER: 11.3 FL (ref 7.5–12.5)
POTASSIUM SERPL-SCNC: 4.6 MMOL/L (ref 3.5–5.3)
PROT SERPL-MCNC: 6.2 G/DL (ref 6.1–8.1)
PROT UR QL STRIP: NEGATIVE
PSA SERPL-MCNC: 0.91 NG/ML
RBC # BLD AUTO: 4.11 MILLION/UL (ref 4.2–5.8)
RBC #/AREA URNS HPF: ABNORMAL /HPF
SERVICE CMNT-IMP: ABNORMAL
SODIUM SERPL-SCNC: 141 MMOL/L (ref 135–146)
SP GR UR STRIP: 1.02 (ref 1–1.03)
SQUAMOUS #/AREA URNS HPF: ABNORMAL /HPF
TRIGL SERPL-MCNC: 95 MG/DL
TSH SERPL-ACNC: 2.03 MIU/L (ref 0.4–4.5)
WBC # BLD AUTO: 6.4 THOUSAND/UL (ref 3.8–10.8)
WBC #/AREA URNS HPF: ABNORMAL /HPF

## 2025-05-23 ENCOUNTER — OFFICE VISIT (OUTPATIENT)
Dept: PRIMARY CARE | Facility: CLINIC | Age: 75
End: 2025-05-23
Payer: MEDICARE

## 2025-05-23 VITALS
OXYGEN SATURATION: 96 % | SYSTOLIC BLOOD PRESSURE: 161 MMHG | HEART RATE: 69 BPM | TEMPERATURE: 98.4 F | BODY MASS INDEX: 26.14 KG/M2 | HEIGHT: 69 IN | DIASTOLIC BLOOD PRESSURE: 100 MMHG

## 2025-05-23 DIAGNOSIS — R05.1 ACUTE COUGH: Primary | ICD-10-CM

## 2025-05-23 PROCEDURE — 3080F DIAST BP >= 90 MM HG: CPT | Performed by: FAMILY MEDICINE

## 2025-05-23 PROCEDURE — 99213 OFFICE O/P EST LOW 20 MIN: CPT | Performed by: FAMILY MEDICINE

## 2025-05-23 PROCEDURE — 1036F TOBACCO NON-USER: CPT | Performed by: FAMILY MEDICINE

## 2025-05-23 PROCEDURE — 3077F SYST BP >= 140 MM HG: CPT | Performed by: FAMILY MEDICINE

## 2025-05-23 RX ORDER — AZITHROMYCIN 250 MG/1
TABLET, FILM COATED ORAL
Qty: 6 TABLET | Refills: 0 | Status: SHIPPED | OUTPATIENT
Start: 2025-05-23 | End: 2025-05-28

## 2025-05-23 RX ORDER — BROMPHENIRAMINE MALEATE, PSEUDOEPHEDRINE HYDROCHLORIDE, AND DEXTROMETHORPHAN HYDROBROMIDE 2; 30; 10 MG/5ML; MG/5ML; MG/5ML
5 SYRUP ORAL 4 TIMES DAILY PRN
Qty: 120 ML | Refills: 0 | Status: SHIPPED | OUTPATIENT
Start: 2025-05-23 | End: 2025-06-02

## 2025-05-23 RX ORDER — BENZONATATE 100 MG/1
200 CAPSULE ORAL 3 TIMES DAILY PRN
Qty: 30 CAPSULE | Refills: 0 | Status: SHIPPED | OUTPATIENT
Start: 2025-05-23 | End: 2025-05-28

## 2025-05-23 NOTE — PROGRESS NOTES
"Subjective   Patient ID: Gregory Hartley is a 74 y.o. male who presents for Cough (Just back from [place] this past Wednesday ).    Assessment/Plan     Problem List Items Addressed This Visit    None      HPI    74-year-old male complaining of cough congestion phlegm production going on since last couple of days he recently had a visit to [place]  Came Wednesday night started having sore throat    No chest pain or shortness of breath complaining of yellow-green phlegm production no fever chills though no dizziness or confusion    Continue conservative treatment will start antibiotic if symptoms are worsening  Patient agrees    Allergies[1]    Current Medications[2]    Objective   Visit Vitals  BP (!) 161/100 (BP Location: Left arm, Patient Position: Sitting)   Pulse 69   Temp 36.9 °C (98.4 °F)   Ht 1.753 m (5' 9\")   SpO2 96%   BMI 26.14 kg/m²   Smoking Status Never   BSA 1.98 m²     Physical Exam  Constitutional:       General: He is not in acute distress.     Appearance: Normal appearance.   HENT:      Head: Normocephalic and atraumatic.      Nose: Nose normal.   Eyes:      Extraocular Movements: Extraocular movements intact.      Conjunctiva/sclera: Conjunctivae normal.   Cardiovascular:      Rate and Rhythm: Normal rate and regular rhythm.   Pulmonary:      Effort: Pulmonary effort is normal.      Breath sounds: Normal breath sounds.   Skin:     General: Skin is warm.   Neurological:      Mental Status: He is alert and oriented to person, place, and time.   Psychiatric:         Mood and Affect: Mood normal.         Behavior: Behavior normal.     Oropharynx no significant abnormality  Bilateral nasal mucosa mild edema erythema clear discharge present  No sinus tenderness no cervical lymphadenopathy  Immunization History   Administered Date(s) Administered    Flu vaccine, quadrivalent, high-dose, preservative free, age 65y+ (FLUZONE) 12/19/2022    Flu vaccine, trivalent, preservative free, HIGH-DOSE, age 65y+ " (Fluzone) 12/19/2022, 02/06/2025    Influenza, Seasonal, Quadrivalent, Adjuvanted 10/06/2021, 10/15/2023    Influenza, Unspecified 11/23/2010    Influenza, injectable, quadrivalent 09/12/2017    Influenza, seasonal, injectable 01/21/2017, 10/15/2023    Moderna SARS-CoV-2 Vaccination 02/27/2021, 03/27/2021, 01/02/2022    Pneumococcal conjugate vaccine, 20-valent (PREVNAR 20) 02/06/2025    Pneumococcal polysaccharide vaccine, 23-valent, age 2 years and older (PNEUMOVAX 23) 08/15/2018       Review of Systems    Office Visit on 02/06/2025   Component Date Value Ref Range Status    PSA, TOTAL 02/06/2025 0.91  < OR = 4.00 ng/mL Final    TSH W/REFLEX TO FT4 02/06/2025 2.03  0.40 - 4.50 mIU/L Final    CHOLESTEROL, TOTAL 02/06/2025 138  <200 mg/dL Final    HDL CHOLESTEROL 02/06/2025 47  > OR = 40 mg/dL Final    TRIGLYCERIDES 02/06/2025 95  <150 mg/dL Final    LDL-CHOLESTEROL 02/06/2025 73  mg/dL (calc) Final    CHOL/HDLC RATIO 02/06/2025 2.9  <5.0 (calc) Final    NON HDL CHOLESTEROL 02/06/2025 91  <130 mg/dL (calc) Final    GLUCOSE 02/06/2025 112 (H)  65 - 99 mg/dL Final    UREA NITROGEN (BUN) 02/06/2025 24  7 - 25 mg/dL Final    CREATININE 02/06/2025 0.95  0.70 - 1.28 mg/dL Final    EGFR 02/06/2025 85  > OR = 60 mL/min/1.73m2 Final    SODIUM 02/06/2025 141  135 - 146 mmol/L Final    POTASSIUM 02/06/2025 4.6  3.5 - 5.3 mmol/L Final    CHLORIDE 02/06/2025 104  98 - 110 mmol/L Final    CARBON DIOXIDE 02/06/2025 31  20 - 32 mmol/L Final    ELECTROLYTE BALANCE 02/06/2025 6 (L)  7 - 17 mmol/L (calc) Final    CALCIUM 02/06/2025 9.2  8.6 - 10.3 mg/dL Final    PROTEIN, TOTAL 02/06/2025 6.2  6.1 - 8.1 g/dL Final    ALBUMIN 02/06/2025 4.4  3.6 - 5.1 g/dL Final    BILIRUBIN, TOTAL 02/06/2025 1.7 (H)  0.2 - 1.2 mg/dL Final    ALKALINE PHOSPHATASE 02/06/2025 61  35 - 144 U/L Final    AST 02/06/2025 17  10 - 35 U/L Final    ALT 02/06/2025 13  9 - 46 U/L Final    WHITE BLOOD CELL COUNT 02/06/2025 6.4  3.8 - 10.8 Thousand/uL Final    RED  BLOOD CELL COUNT 02/06/2025 4.11 (L)  4.20 - 5.80 Million/uL Final    HEMOGLOBIN 02/06/2025 14.5  13.2 - 17.1 g/dL Final    HEMATOCRIT 02/06/2025 42.1  38.5 - 50.0 % Final    MCV 02/06/2025 102.4 (H)  80.0 - 100.0 fL Final    MCH 02/06/2025 35.3 (H)  27.0 - 33.0 pg Final    MCHC 02/06/2025 34.4  32.0 - 36.0 g/dL Final    RDW 02/06/2025 14.2  11.0 - 15.0 % Final    PLATELET COUNT 02/06/2025 155  140 - 400 Thousand/uL Final    MPV 02/06/2025 11.3  7.5 - 12.5 fL Final    COLOR 02/06/2025 DARK YELLOW  YELLOW Final    APPEARANCE 02/06/2025 CLEAR  CLEAR Final    SPECIFIC GRAVITY 02/06/2025 1.025  1.001 - 1.035 Final    PH 02/06/2025 5.5  5.0 - 8.0 Final    GLUCOSE 02/06/2025 NEGATIVE  NEGATIVE Final    BILIRUBIN 02/06/2025 1+ (A)  NEGATIVE Final    KETONES 02/06/2025 TRACE (A)  NEGATIVE Final    OCCULT BLOOD 02/06/2025 2+ (A)  NEGATIVE Final    PROTEIN 02/06/2025 NEGATIVE  NEGATIVE Final    NITRITE 02/06/2025 NEGATIVE  NEGATIVE Final    LEUKOCYTE ESTERASE 02/06/2025 TRACE (A)  NEGATIVE Final    WBC 02/06/2025 NONE SEEN  < OR = 5 /HPF Final    RBC 02/06/2025 3-10 (A)  < OR = 2 /HPF Final    SQUAMOUS EPITHELIAL CELLS 02/06/2025 NONE SEEN  < OR = 5 /HPF Final    BACTERIA 02/06/2025 NONE SEEN  NONE SEEN /HPF Final    HYALINE CAST 02/06/2025 NONE SEEN  NONE SEEN /LPF Final    NOTE 02/06/2025    Final    MAGNESIUM 02/06/2025 2.1  1.5 - 2.5 mg/dL Final    HEMOGLOBIN A1c 02/06/2025 4.7  <5.7 % of total Hgb Final       Radiology: Reviewed imaging in powerchart.  Imaging  No results found.    Cardiology, Vascular, and Other Imaging  No other imaging results found for the past 7 days      Family History[3]  Social History[4]  Medical History[5]  Surgical History[6]    Charting was completed using voice recognition technology and may include unintended errors.            [1] No Known Allergies  [2]   Current Outpatient Medications   Medication Sig Dispense Refill    atenoloL-chlorthalidone (Tenoretic) 50-25 mg tablet Take 0.5 tablets  by mouth once daily. (Patient taking differently: Take 0.5 tablets by mouth once daily. Pt has been taking 1/4 a tab recently) 45 tablet 3    cholecalciferol (Vitamin D-3) 25 MCG (1000 UT) capsule Take by mouth.       No current facility-administered medications for this visit.   [3] No family history on file.  [4]   Social History  Socioeconomic History    Marital status: Unknown   Tobacco Use    Smoking status: Never    Smokeless tobacco: Never   Substance and Sexual Activity    Alcohol use: Not Currently    Drug use: Never   [5]   Past Medical History:  Diagnosis Date    Benign prostatic hyperplasia without lower urinary tract symptoms 01/13/2016    Enlarged prostate    Cyst of kidney, acquired     Renal cyst    Other specified diseases of liver     Liver cyst    Personal history of colonic polyps     History of colon polyps    Personal history of other endocrine, nutritional and metabolic disease     History of hyperlipidemia   [6]   Past Surgical History:  Procedure Laterality Date    OTHER SURGICAL HISTORY  01/13/2021    No history of surgery